# Patient Record
Sex: FEMALE | Race: BLACK OR AFRICAN AMERICAN | NOT HISPANIC OR LATINO | URBAN - METROPOLITAN AREA
[De-identification: names, ages, dates, MRNs, and addresses within clinical notes are randomized per-mention and may not be internally consistent; named-entity substitution may affect disease eponyms.]

---

## 2023-12-14 ENCOUNTER — APPOINTMENT (RX ONLY)
Dept: URBAN - METROPOLITAN AREA CLINIC 41 | Facility: CLINIC | Age: 38
Setting detail: DERMATOLOGY
End: 2023-12-14

## 2023-12-14 DIAGNOSIS — L10.0 PEMPHIGUS VULGARIS: ICD-10-CM | Status: INADEQUATELY CONTROLLED

## 2023-12-14 PROCEDURE — 99204 OFFICE O/P NEW MOD 45 MIN: CPT

## 2023-12-14 PROCEDURE — ? PRESCRIPTION MEDICATION MANAGEMENT

## 2023-12-14 PROCEDURE — ? PRESCRIPTION

## 2023-12-14 PROCEDURE — ? ADDITIONAL NOTES

## 2023-12-14 PROCEDURE — ? COUNSELING

## 2023-12-14 PROCEDURE — ? RECORDS REVIEWED

## 2023-12-14 RX ORDER — PREDNISONE 20 MG/1
TABLET ORAL
Qty: 42 | Refills: 0 | Status: ERX | COMMUNITY
Start: 2023-12-14

## 2023-12-14 RX ADMIN — PREDNISONE: 20 TABLET ORAL at 00:00

## 2023-12-14 ASSESSMENT — LOCATION SIMPLE DESCRIPTION DERM
LOCATION SIMPLE: RIGHT MAXILLARY GINGIVA
LOCATION SIMPLE: RIGHT SOFT PALATE
LOCATION SIMPLE: LEFT MAXILLARY GINGIVA
LOCATION SIMPLE: LEFT MANDIBULAR GINGIVA
LOCATION SIMPLE: RIGHT MANDIBULAR GINGIVA
LOCATION SIMPLE: LEFT SOFT PALATE

## 2023-12-14 ASSESSMENT — LOCATION DETAILED DESCRIPTION DERM
LOCATION DETAILED: RIGHT MEDIAL MANDIBULAR GINGIVA
LOCATION DETAILED: LEFT MEDIAL MANDIBULAR GINGIVA
LOCATION DETAILED: RIGHT SUPEROLATERAL SOFT PALATE
LOCATION DETAILED: LEFT SUPEROLATERAL SOFT PALATE
LOCATION DETAILED: LEFT MEDIAL MAXILLARY GINGIVA
LOCATION DETAILED: RIGHT MEDIAL MAXILLARY GINGIVA

## 2023-12-14 ASSESSMENT — LOCATION ZONE DERM
LOCATION ZONE: OROPHARYNX
LOCATION ZONE: ORAL_CAVITY

## 2023-12-14 NOTE — PROCEDURE: PRESCRIPTION MEDICATION MANAGEMENT
Continue Regimen: Protonix
Modify Regimen: Prednisone taper - taper down to 40mg x 3 weeks
Detail Level: Zone
Plan: [facility]\\nNeed: hepatitis negative test, bx results to send infusion order\\nPt should RTO or contact LC if experiencing symptoms on 40mg
Render In Strict Bullet Format?: No

## 2023-12-14 NOTE — PROCEDURE: COUNSELING
Patient Specific Counseling (Will Not Stick From Patient To Patient): -\\nBG and LC  pt on infusion tx. \\n\\nPt denies any ulcers elsewhere on the body currently, but mentions previous genital ulcers. Shows BG photos of previous ulcers in mouth and genitals.\\n\\nBG asks about diet. PT says that she lost 48 lbs in the last 2 months due to inability to eat.
Detail Level: Detailed

## 2023-12-14 NOTE — PROCEDURE: ADDITIONAL NOTES
Detail Level: Simple
Render Risk Assessment In Note?: no
Additional Notes: Reviewed discharge summary and pathology report from hospital stay\\nPt condition is currently stable on high dose prednisone \\nWill continue and start process for Rituxant infusion

## 2023-12-14 NOTE — HPI: BLISTERS (BULLOUS PEMPHIGOID)
Is This A New Presentation, Or A Follow-Up?: Blisters
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

## 2024-01-08 ENCOUNTER — RX ONLY (OUTPATIENT)
Age: 39
Setting detail: RX ONLY
End: 2024-01-08

## 2024-01-08 RX ORDER — PREDNISONE 20 MG/1
TABLET ORAL
Qty: 21 | Refills: 0 | Status: ERX

## 2024-01-29 ENCOUNTER — RX ONLY (OUTPATIENT)
Age: 39
Setting detail: RX ONLY
End: 2024-01-29

## 2024-01-29 RX ORDER — PREDNISONE 20 MG/1
TABLET ORAL
Qty: 14 | Refills: 0 | Status: ERX

## 2024-03-18 ENCOUNTER — APPOINTMENT (RX ONLY)
Dept: URBAN - METROPOLITAN AREA CLINIC 41 | Facility: CLINIC | Age: 39
Setting detail: DERMATOLOGY
End: 2024-03-18

## 2024-03-18 DIAGNOSIS — L10.0 PEMPHIGUS VULGARIS: ICD-10-CM | Status: INADEQUATELY CONTROLLED

## 2024-03-18 PROCEDURE — ? COUNSELING

## 2024-03-18 PROCEDURE — 99214 OFFICE O/P EST MOD 30 MIN: CPT

## 2024-03-18 PROCEDURE — ? PRESCRIPTION MEDICATION MANAGEMENT

## 2024-03-18 ASSESSMENT — LOCATION SIMPLE DESCRIPTION DERM
LOCATION SIMPLE: RIGHT SOFT PALATE
LOCATION SIMPLE: RIGHT MAXILLARY GINGIVA
LOCATION SIMPLE: LEFT MANDIBULAR GINGIVA
LOCATION SIMPLE: LEFT SOFT PALATE
LOCATION SIMPLE: LEFT MAXILLARY GINGIVA
LOCATION SIMPLE: RIGHT MANDIBULAR GINGIVA

## 2024-03-18 ASSESSMENT — LOCATION DETAILED DESCRIPTION DERM
LOCATION DETAILED: RIGHT MEDIAL MANDIBULAR GINGIVA
LOCATION DETAILED: LEFT SUPEROLATERAL SOFT PALATE
LOCATION DETAILED: LEFT MEDIAL MAXILLARY GINGIVA
LOCATION DETAILED: RIGHT SUPEROLATERAL SOFT PALATE
LOCATION DETAILED: LEFT MEDIAL MANDIBULAR GINGIVA
LOCATION DETAILED: RIGHT MEDIAL MAXILLARY GINGIVA

## 2024-03-18 ASSESSMENT — LOCATION ZONE DERM
LOCATION ZONE: ORAL_CAVITY
LOCATION ZONE: OROPHARYNX

## 2024-03-18 NOTE — PROCEDURE: COUNSELING
Patient Specific Counseling (Will Not Stick From Patient To Patient): -\\nBG and LC evaluate patient. BG asks pt about symptoms and pt states there is tingling and pain in the leg. BG asks if pt has shortness of breath and pt states only for a few minutes after climbing stairs. BG asks pt if she has the same symptoms as her previous blood clot and pt states it feels the same.\\nBG inquires about her history of blood clotting and pt states she went to hematologist for abnormally heavy menstrual flow and was put on eliquis. BG states this does not make sense to be on a blood thinner if pt was bleeding heavily so asks if it could have been a clotting issue in the past. Pt states she is unsure exactly what was going on. \\nBG states at this point we are worried about complications that come with the infusion. BG states it is rare but we cannot disregard it at all due to the symptoms patient is presenting with. BG states we have to prioritize this over the skin issues. \\nBG states pt needs to see PCP or hematologist to have serious further work up to ensure there is no clotting in lungs or legs. BG states we are not the ordering physicians for the medical work up that pt needs. BG counsels that she needs to see someone who is very in tune with the results they are interpreting and states that this goes outside of the dermatology realm.\\nPt states she is in pain and BG states he does not want to complicate anything’s further but states she could take Tylenol for discomfort. \\nBG states it may be worthwhile to go to the ER since blood clots can be a life threatening issue.
Detail Level: Detailed

## 2024-03-18 NOTE — PROCEDURE: PRESCRIPTION MEDICATION MANAGEMENT
Detail Level: Zone
Render In Strict Bullet Format?: No
Plan: Go to emergency room today.\\nDiscuss with PCP and see if hematologists need to get involved.

## 2024-03-28 ENCOUNTER — RX ONLY (OUTPATIENT)
Age: 39
Setting detail: RX ONLY
End: 2024-03-28

## 2024-03-28 RX ORDER — PREDNISONE 20 MG/1
TABLET ORAL
Qty: 28 | Refills: 0 | Status: ERX